# Patient Record
Sex: MALE | URBAN - METROPOLITAN AREA
[De-identification: names, ages, dates, MRNs, and addresses within clinical notes are randomized per-mention and may not be internally consistent; named-entity substitution may affect disease eponyms.]

---

## 2023-06-21 ENCOUNTER — EMERGENCY (EMERGENCY)
Facility: HOSPITAL | Age: 43
LOS: 1 days | Discharge: HOME | End: 2023-06-21
Attending: EMERGENCY MEDICINE
Payer: COMMERCIAL

## 2023-06-21 VITALS
TEMPERATURE: 98 F | RESPIRATION RATE: 18 BRPM | DIASTOLIC BLOOD PRESSURE: 80 MMHG | HEART RATE: 69 BPM | OXYGEN SATURATION: 98 % | SYSTOLIC BLOOD PRESSURE: 142 MMHG

## 2023-06-21 VITALS
OXYGEN SATURATION: 100 % | HEIGHT: 67 IN | TEMPERATURE: 98 F | DIASTOLIC BLOOD PRESSURE: 84 MMHG | RESPIRATION RATE: 18 BRPM | WEIGHT: 185.41 LBS | SYSTOLIC BLOOD PRESSURE: 142 MMHG | HEART RATE: 84 BPM

## 2023-06-21 DIAGNOSIS — R07.9 CHEST PAIN, UNSPECIFIED: ICD-10-CM

## 2023-06-21 PROBLEM — Z00.00 ENCOUNTER FOR PREVENTIVE HEALTH EXAMINATION: Status: ACTIVE | Noted: 2023-06-21

## 2023-06-21 LAB
ALBUMIN SERPL ELPH-MCNC: 4.4 G/DL — SIGNIFICANT CHANGE UP (ref 3.3–5.2)
ALP SERPL-CCNC: 70 U/L — SIGNIFICANT CHANGE UP (ref 40–120)
ALT FLD-CCNC: 30 U/L — SIGNIFICANT CHANGE UP
ANION GAP SERPL CALC-SCNC: 13 MMOL/L — SIGNIFICANT CHANGE UP (ref 5–17)
AST SERPL-CCNC: 26 U/L — SIGNIFICANT CHANGE UP
BASOPHILS # BLD AUTO: 0.04 K/UL — SIGNIFICANT CHANGE UP (ref 0–0.2)
BASOPHILS NFR BLD AUTO: 0.5 % — SIGNIFICANT CHANGE UP (ref 0–2)
BILIRUB SERPL-MCNC: 0.8 MG/DL — SIGNIFICANT CHANGE UP (ref 0.4–2)
BUN SERPL-MCNC: 12.9 MG/DL — SIGNIFICANT CHANGE UP (ref 8–20)
CALCIUM SERPL-MCNC: 9 MG/DL — SIGNIFICANT CHANGE UP (ref 8.4–10.5)
CHLORIDE SERPL-SCNC: 101 MMOL/L — SIGNIFICANT CHANGE UP (ref 96–108)
CO2 SERPL-SCNC: 22 MMOL/L — SIGNIFICANT CHANGE UP (ref 22–29)
CREAT SERPL-MCNC: 0.63 MG/DL — SIGNIFICANT CHANGE UP (ref 0.5–1.3)
EGFR: 121 ML/MIN/1.73M2 — SIGNIFICANT CHANGE UP
EOSINOPHIL # BLD AUTO: 0.04 K/UL — SIGNIFICANT CHANGE UP (ref 0–0.5)
EOSINOPHIL NFR BLD AUTO: 0.5 % — SIGNIFICANT CHANGE UP (ref 0–6)
GLUCOSE SERPL-MCNC: 91 MG/DL — SIGNIFICANT CHANGE UP (ref 70–99)
HCT VFR BLD CALC: 38.1 % — LOW (ref 39–50)
HGB BLD-MCNC: 13.6 G/DL — SIGNIFICANT CHANGE UP (ref 13–17)
IMM GRANULOCYTES NFR BLD AUTO: 0.2 % — SIGNIFICANT CHANGE UP (ref 0–0.9)
LIDOCAIN IGE QN: 30 U/L — SIGNIFICANT CHANGE UP (ref 22–51)
LYMPHOCYTES # BLD AUTO: 1.74 K/UL — SIGNIFICANT CHANGE UP (ref 1–3.3)
LYMPHOCYTES # BLD AUTO: 20.3 % — SIGNIFICANT CHANGE UP (ref 13–44)
MAGNESIUM SERPL-MCNC: 1.9 MG/DL — SIGNIFICANT CHANGE UP (ref 1.8–2.6)
MCHC RBC-ENTMCNC: 32.7 PG — SIGNIFICANT CHANGE UP (ref 27–34)
MCHC RBC-ENTMCNC: 35.7 GM/DL — SIGNIFICANT CHANGE UP (ref 32–36)
MCV RBC AUTO: 91.6 FL — SIGNIFICANT CHANGE UP (ref 80–100)
MONOCYTES # BLD AUTO: 0.56 K/UL — SIGNIFICANT CHANGE UP (ref 0–0.9)
MONOCYTES NFR BLD AUTO: 6.5 % — SIGNIFICANT CHANGE UP (ref 2–14)
NEUTROPHILS # BLD AUTO: 6.16 K/UL — SIGNIFICANT CHANGE UP (ref 1.8–7.4)
NEUTROPHILS NFR BLD AUTO: 72 % — SIGNIFICANT CHANGE UP (ref 43–77)
NT-PROBNP SERPL-SCNC: 16 PG/ML — SIGNIFICANT CHANGE UP (ref 0–300)
PLATELET # BLD AUTO: 186 K/UL — SIGNIFICANT CHANGE UP (ref 150–400)
POTASSIUM SERPL-MCNC: 4.2 MMOL/L — SIGNIFICANT CHANGE UP (ref 3.5–5.3)
POTASSIUM SERPL-SCNC: 4.2 MMOL/L — SIGNIFICANT CHANGE UP (ref 3.5–5.3)
PROT SERPL-MCNC: 6.7 G/DL — SIGNIFICANT CHANGE UP (ref 6.6–8.7)
RBC # BLD: 4.16 M/UL — LOW (ref 4.2–5.8)
RBC # FLD: 10.9 % — SIGNIFICANT CHANGE UP (ref 10.3–14.5)
SODIUM SERPL-SCNC: 136 MMOL/L — SIGNIFICANT CHANGE UP (ref 135–145)
TROPONIN T SERPL-MCNC: <0.01 NG/ML — SIGNIFICANT CHANGE UP (ref 0–0.06)
WBC # BLD: 8.56 K/UL — SIGNIFICANT CHANGE UP (ref 3.8–10.5)
WBC # FLD AUTO: 8.56 K/UL — SIGNIFICANT CHANGE UP (ref 3.8–10.5)

## 2023-06-21 PROCEDURE — 99285 EMERGENCY DEPT VISIT HI MDM: CPT

## 2023-06-21 PROCEDURE — 83735 ASSAY OF MAGNESIUM: CPT

## 2023-06-21 PROCEDURE — 93010 ELECTROCARDIOGRAM REPORT: CPT | Mod: 76

## 2023-06-21 PROCEDURE — 93005 ELECTROCARDIOGRAM TRACING: CPT

## 2023-06-21 PROCEDURE — 99285 EMERGENCY DEPT VISIT HI MDM: CPT | Mod: 25

## 2023-06-21 PROCEDURE — 84484 ASSAY OF TROPONIN QUANT: CPT

## 2023-06-21 PROCEDURE — 99284 EMERGENCY DEPT VISIT MOD MDM: CPT

## 2023-06-21 PROCEDURE — 80053 COMPREHEN METABOLIC PANEL: CPT

## 2023-06-21 PROCEDURE — 96374 THER/PROPH/DIAG INJ IV PUSH: CPT

## 2023-06-21 PROCEDURE — 71045 X-RAY EXAM CHEST 1 VIEW: CPT

## 2023-06-21 PROCEDURE — 36415 COLL VENOUS BLD VENIPUNCTURE: CPT

## 2023-06-21 PROCEDURE — 71045 X-RAY EXAM CHEST 1 VIEW: CPT | Mod: 26

## 2023-06-21 PROCEDURE — 85025 COMPLETE CBC W/AUTO DIFF WBC: CPT

## 2023-06-21 PROCEDURE — 83690 ASSAY OF LIPASE: CPT

## 2023-06-21 PROCEDURE — 83880 ASSAY OF NATRIURETIC PEPTIDE: CPT

## 2023-06-21 RX ORDER — FAMOTIDINE 10 MG/ML
20 INJECTION INTRAVENOUS ONCE
Refills: 0 | Status: COMPLETED | OUTPATIENT
Start: 2023-06-21 | End: 2023-06-21

## 2023-06-21 RX ORDER — ALPRAZOLAM 0.25 MG
0.5 TABLET ORAL ONCE
Refills: 0 | Status: DISCONTINUED | OUTPATIENT
Start: 2023-06-21 | End: 2023-06-21

## 2023-06-21 RX ORDER — HYDROXYZINE HCL 10 MG
1 TABLET ORAL
Qty: 20 | Refills: 0
Start: 2023-06-21 | End: 2023-06-25

## 2023-06-21 RX ORDER — ASPIRIN/CALCIUM CARB/MAGNESIUM 324 MG
162 TABLET ORAL ONCE
Refills: 0 | Status: COMPLETED | OUTPATIENT
Start: 2023-06-21 | End: 2023-06-21

## 2023-06-21 RX ADMIN — FAMOTIDINE 20 MILLIGRAM(S): 10 INJECTION INTRAVENOUS at 13:29

## 2023-06-21 RX ADMIN — Medication 162 MILLIGRAM(S): at 13:22

## 2023-06-21 RX ADMIN — Medication 0.5 MILLIGRAM(S): at 13:22

## 2023-06-21 NOTE — CONSULT NOTE ADULT - ASSESSMENT
41 y/o M with PMH anxiety, frequent ETOH use who presents to [facility] ED with 2 weeks of intermittent chest pain with radiation to epigastric area described as pressure like. 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 He had been ignoring the chest pain, but today while at work, the pain came back worse than before and he decided to come in for evaluation. He endorses ETOH use, about 6-10 drinks 4x a week. He denies symptoms with the chest pain. Trop negative x 1 and EKG without ischemic changes. Denies  back pain, headache, dizziness, SOB, PALMA, diaphoresis, syncope or N/V. 41 y/o M with PMH anxiety, frequent ETOH use who presents to [facility] ED with 2 weeks of intermittent chest pain with radiation to epigastric area described as pressure like. 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 He had been ignoring the chest pain, but today while at work, the pain came back worse than before and he decided to come in for evaluation. He endorses ETOH use, about 6-10 drinks 4x a week. He denies symptoms with the chest pain. Trop negative x 1 and EKG without ischemic changes. Denies  back pain, headache, dizziness, SOB, PALMA, diaphoresis, syncope or N/V. 41 y/o M with PMH anxiety, frequent ETOH use who presents to [facility] ED with 2 weeks of intermittent chest pain with radiation to epigastric area described as pressure like. 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 He had been ignoring the chest pain, but today while at work, the pain came back worse than before and he decided to come in for evaluation. He endorses ETOH use, about 6-10 drinks 4x a week. He denies symptoms with the chest pain. Trop negative x 1 and EKG without ischemic changes. Denies  back pain, headache, dizziness, SOB, PALMA, diaphoresis, syncope or N/V.

## 2023-06-21 NOTE — ED ADULT TRIAGE NOTE - CHIEF COMPLAINT QUOTE
[facility] URGENT [facility]ARE [facility]  [address] [address]  [phone]      November 10, 2022    RE:  Brayden Best                                                                                                                                                       [address] [facility] [address]            To whom it may concern:    Brayden Best is under my professional care for Left foot pain.   He  may return to work with the following: No working or lifting restrictions on or about after recheck with Podiatry.          Sincerely,        Santos Chapa DO    [facility]         pt states he is having mid chest pain, thought they were panick attacks, denies SOB  A&Ox3, resp wnl, c/o a lot of stress lately

## 2023-06-21 NOTE — ED PROVIDER NOTE - PROVIDER TOKENS
PROVIDER:[TOKEN:[[id number]]] PROVIDER:[TOKEN:[[id number]]] PROVIDER:[TOKEN:[[id number]]]

## 2023-06-21 NOTE — ED PROVIDER NOTE - PHYSICAL EXAMINATION
Gen: Well appearing in NAD  Head: NC/AT  Neck: trachea midline  Card: regular rate and rhythm  Resp:  No distress, CTAB  Abd: soft, non-distended, non-tender  Ext: no deformities  Neuro:  A&O appears non focal  Skin:  Warm and dry as visualized  Psych:  Normal affect and mood

## 2023-06-21 NOTE — ED PROVIDER NOTE - CLINICAL SUMMARY MEDICAL DECISION MAKING FREE TEXT BOX
43 yo male no pmh with chest pain , epigastric pain; pt under stress from work with chest pain x  2weeks;  serial troponin and close follow up with cardiology 41 yo male no pmh with chest pain , epigastric pain; pt under stress from work with chest pain x  2weeks;  serial troponin and close follow up with cardiology

## 2023-06-21 NOTE — ED ADULT NURSE REASSESSMENT NOTE - NSFALLUNIVINTERV_ED_ALL_ED
Bed/Stretcher in lowest position, wheels locked, appropriate side rails in place/Call bell, personal items and telephone in reach/Instruct patient to call for assistance before getting out of bed/chair/stretcher/Non-slip footwear applied when patient is off stretcher/[place] to call system/Physically safe environment - no spills, clutter or unnecessary equipment/Purposeful proactive rounding/Room/bathroom lighting operational, light cord in reach Bed/Stretcher in lowest position, wheels locked, appropriate side rails in place/Call bell, personal items and telephone in reach/Instruct patient to call for assistance before getting out of bed/chair/stretcher/Non-slip footwear applied when patient is off stretcher/[place] to call system/Physically safe environment - no spills, clutter or unnecessary equipment/Purposeful proactive rounding/Room/bathroom lighting operational, light cord in reach Bed/Stretcher in lowest position, wheels locked, appropriate side rails in place/Call bell, personal items and telephone in reach/Instruct patient to call for assistance before getting out of bed/chair/stretcher/Non-slip footwear applied when patient is off stretcher/[place] to call system/Physically safe environment - no spills, clutter or unnecessary equipment/Purposeful proactive rounding/Room/bathroom lighting operational, light cord in reach

## 2023-06-21 NOTE — ED ADULT NURSE REASSESSMENT NOTE - NS ED NURSE REASSESS COMMENT FT1
Pt is a&o x4. Pt is resting comfortably in stretcher. Pt is on  oxygen 98% and cardiac monitor reading normal sinus rhythm. Cardiology came to bedside and troponin repeated at 1700. Pt waiting for results.

## 2023-06-21 NOTE — CONSULT NOTE ADULT - SUBJECTIVE AND OBJECTIVE BOX
[facility]                                              CARDIOLOGY AT [place] [address]                                             Telephone: [phone]. Fax:[phone]                                                       CARDIOLOGY CONSULTATION NOTE                                                                                             History obtained by: Patient and medical record  Community Cardiologist: none   obtained: Yes [  ] No [ x ]  Reason for Consultation: Chest Pain   Available out pt records reviewed: Yes [  ] No [  ]    Chief complaint:    Patient is a 42y old  Male who presents with a chief complaint of     HPI:  43 y/o M with PMH anxiety, frequent ETOH use who presents to [facility] ED with 2 weeks of intermittent chest pain with radiation to epigastric area described as pressure like. 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 He had been ignoring the chest pain, but today while at work, the pain came back worse than before and he decided to come in for evaluation. He endorses ETOH use, about 6-10 drinks 4x a week. He denies symptoms with the chest pain. Trop negative x 1 and EKG without ischemic changes. Denies  back pain, headache, dizziness, SOB, PALMA, diaphoresis, syncope or N/V.    CARDIAC TESTING   ECHO:    STRESS:    CATH:     ELECTROPHYSIOLOGY:     PAST MEDICAL HISTORY      PAST SURGICAL HISTORY      SOCIAL HISTORY:  Denies smoking/alcohol/drugs  CIGARETTES:     ALCOHOL: etoh 6-10 drinks daily  DRUGS:    FAMILY HISTORY:    Family History of Cardiovascular Disease:  Yes [  ] No [ x ]  Coronary Artery Disease in first degree relative: Yes [  ] No [ x ]  Sudden Cardiac Death in First degree relative: Yes [  ] No [ x ]    HOME MEDICATIONS:      CURRENT CARDIAC MEDICATIONS:      CURRENT OTHER MEDICATIONS:      ALLERGIES:   No Known Allergies      REVIEW OF SYMPTOMS:   CONSTITUTIONAL: No fever, no chills, no weight loss, no weight gain, no fatigue   ENMT:  No vertigo; No sinus or throat pain  NECK: No pain or stiffness  CARDIOVASCULAR:+ chest pain, no dyspnea, no syncope/presyncope, no palpitations, no dizziness, no Orthopnea, no Paroxsymal nocturnal dyspnea  RESPIRATORY: no Shortness of breath, no cough, no wheezing  : No dysuria, no hematuria   GI: No dark color stool, no nausea, no diarrhea, no constipation, no abdominal pain   NEURO: No headache, no slurred speech   MUSCULOSKELETAL: No joint pain or swelling; No muscle, back, or extremity pain  PSYCH: No agitation, no anxiety.    ALL OTHER REVIEW OF SYSTEMS ARE NEGATIVE.    VITAL SIGNS:  T(C): 36.8 (06-21-23 @ 15:56), Max: 36.8 (06-21-23 @ 15:56)  T(F): 98.2 (06-21-23 @ 15:56), Max: 98.2 (06-21-23 @ 15:56)  HR: 78 (06-21-23 @ 15:56) (78 - 84)  BP: 124/72 (06-21-23 @ 15:56) (124/72 - 142/84)  RR: 18 (06-21-23 @ 15:56) (18 - 18)  SpO2: 98% (06-21-23 @ 15:56) (98% - 100%)    INTAKE AND OUTPUT:       PHYSICAL EXAM:  Constitutional: Comfortable . No acute distress.   HEENT: Atraumatic and normocephalic , neck is supple . no JVD. No carotid bruit.  CNS: A&Ox3. No focal deficits.   Respiratory: CTAB, unlabored   Cardiovascular: RRR normal s1 s2. No murmur. No rubs or gallop.  Gastrointestinal: Soft, non-tender. +Bowel sounds.   Extremities: 2+ Peripheral Pulses, No clubbing, cyanosis, or edema  Psychiatric: Calm . no agitation.   Skin: Warm and dry, no ulcers on extremities     LABS:  ( 21 Jun 2023 13:16 )  Troponin T  <0.01,  CPK  X    , CKMB  X    , BNP X                                  13.6   8.56  )-----------( 186      ( 21 Jun 2023 13:16 )             38.1     06-21    136  |  101  |  12.9  ----------------------------<  91  4.2   |  22.0  |  0.63    Ca    9.0      21 Jun 2023 13:16  Mg     1.9     06-21    TPro  6.7  /  Alb  4.4  /  TBili  0.8  /  DBili  x   /  AST  26  /  ALT  30  /  AlkPhos  70  06-21      Urinalysis Basic - ( 21 Jun 2023 13:16 )    Color: x / Appearance: x / SG: x / pH: x  Gluc: 91 mg/dL / Ketone: x  / Bili: x / Urobili: x   Blood: x / Protein: x / Nitrite: x   Leuk Esterase: x / RBC: x / WBC x   Sq Epi: x / Non Sq Epi: x / Bacteria: x              INTERPRETATION OF TELEMETRY:     ECG: NSR  Prior ECG: Yes [  ] No [  ]    RADIOLOGY & ADDITIONAL STUDIES:    X-ray:    CT scan:   MRI:   US:                                                [facility]                                              CARDIOLOGY AT [place] [address]                                             Telephone: [phone]. Fax:[phone]                                                       CARDIOLOGY CONSULTATION NOTE                                                                                             History obtained by: Patient and medical record  Community Cardiologist: none   obtained: Yes [  ] No [ x ]  Reason for Consultation: Chest Pain   Available out pt records reviewed: Yes [  ] No [  ]    Chief complaint:    Patient is a 42y old  Male who presents with a chief complaint of     HPI:  41 y/o M with PMH anxiety, frequent ETOH use who presents to [facility] ED with 2 weeks of intermittent chest pain with radiation to epigastric area described as pressure like. 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 He had been ignoring the chest pain, but today while at work, the pain came back worse than before and he decided to come in for evaluation. He endorses ETOH use, about 6-10 drinks 4x a week. He denies symptoms with the chest pain. Trop negative x 1 and EKG without ischemic changes. Denies  back pain, headache, dizziness, SOB, PALMA, diaphoresis, syncope or N/V.    CARDIAC TESTING   ECHO:    STRESS:    CATH:     ELECTROPHYSIOLOGY:     PAST MEDICAL HISTORY      PAST SURGICAL HISTORY      SOCIAL HISTORY:  Denies smoking/alcohol/drugs  CIGARETTES:     ALCOHOL: etoh 6-10 drinks daily  DRUGS:    FAMILY HISTORY:    Family History of Cardiovascular Disease:  Yes [  ] No [ x ]  Coronary Artery Disease in first degree relative: Yes [  ] No [ x ]  Sudden Cardiac Death in First degree relative: Yes [  ] No [ x ]    HOME MEDICATIONS:      CURRENT CARDIAC MEDICATIONS:      CURRENT OTHER MEDICATIONS:      ALLERGIES:   No Known Allergies      REVIEW OF SYMPTOMS:   CONSTITUTIONAL: No fever, no chills, no weight loss, no weight gain, no fatigue   ENMT:  No vertigo; No sinus or throat pain  NECK: No pain or stiffness  CARDIOVASCULAR:+ chest pain, no dyspnea, no syncope/presyncope, no palpitations, no dizziness, no Orthopnea, no Paroxsymal nocturnal dyspnea  RESPIRATORY: no Shortness of breath, no cough, no wheezing  : No dysuria, no hematuria   GI: No dark color stool, no nausea, no diarrhea, no constipation, no abdominal pain   NEURO: No headache, no slurred speech   MUSCULOSKELETAL: No joint pain or swelling; No muscle, back, or extremity pain  PSYCH: No agitation, no anxiety.    ALL OTHER REVIEW OF SYSTEMS ARE NEGATIVE.    VITAL SIGNS:  T(C): 36.8 (06-21-23 @ 15:56), Max: 36.8 (06-21-23 @ 15:56)  T(F): 98.2 (06-21-23 @ 15:56), Max: 98.2 (06-21-23 @ 15:56)  HR: 78 (06-21-23 @ 15:56) (78 - 84)  BP: 124/72 (06-21-23 @ 15:56) (124/72 - 142/84)  RR: 18 (06-21-23 @ 15:56) (18 - 18)  SpO2: 98% (06-21-23 @ 15:56) (98% - 100%)    INTAKE AND OUTPUT:       PHYSICAL EXAM:  Constitutional: Comfortable . No acute distress.   HEENT: Atraumatic and normocephalic , neck is supple . no JVD. No carotid bruit.  CNS: A&Ox3. No focal deficits.   Respiratory: CTAB, unlabored   Cardiovascular: RRR normal s1 s2. No murmur. No rubs or gallop.  Gastrointestinal: Soft, non-tender. +Bowel sounds.   Extremities: 2+ Peripheral Pulses, No clubbing, cyanosis, or edema  Psychiatric: Calm . no agitation.   Skin: Warm and dry, no ulcers on extremities     LABS:  ( 21 Jun 2023 13:16 )  Troponin T  <0.01,  CPK  X    , CKMB  X    , BNP X                                  13.6   8.56  )-----------( 186      ( 21 Jun 2023 13:16 )             38.1     06-21    136  |  101  |  12.9  ----------------------------<  91  4.2   |  22.0  |  0.63    Ca    9.0      21 Jun 2023 13:16  Mg     1.9     06-21    TPro  6.7  /  Alb  4.4  /  TBili  0.8  /  DBili  x   /  AST  26  /  ALT  30  /  AlkPhos  70  06-21      Urinalysis Basic - ( 21 Jun 2023 13:16 )    Color: x / Appearance: x / SG: x / pH: x  Gluc: 91 mg/dL / Ketone: x  / Bili: x / Urobili: x   Blood: x / Protein: x / Nitrite: x   Leuk Esterase: x / RBC: x / WBC x   Sq Epi: x / Non Sq Epi: x / Bacteria: x              INTERPRETATION OF TELEMETRY:     ECG: NSR  Prior ECG: Yes [  ] No [  ]    RADIOLOGY & ADDITIONAL STUDIES:    X-ray:    CT scan:   MRI:   US:                                                [facility]                                              CARDIOLOGY AT [place] [address]                                             Telephone: [phone]. Fax:[phone]                                                       CARDIOLOGY CONSULTATION NOTE                                                                                             History obtained by: Patient and medical record  Community Cardiologist: none   obtained: Yes [  ] No [ x ]  Reason for Consultation: Chest Pain   Available out pt records reviewed: Yes [  ] No [  ]    Chief complaint:    Patient is a 42y old  Male who presents with a chief complaint of     HPI:  41 y/o M with PMH anxiety, frequent ETOH use who presents to [facility] ED with 2 weeks of intermittent chest pain with radiation to epigastric area described as pressure like. 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 He had been ignoring the chest pain, but today while at work, the pain came back worse than before and he decided to come in for evaluation. He endorses ETOH use, about 6-10 drinks 4x a week. He denies symptoms with the chest pain. Trop negative x 1 and EKG without ischemic changes. Denies  back pain, headache, dizziness, SOB, PALMA, diaphoresis, syncope or N/V.    CARDIAC TESTING   ECHO:    STRESS:    CATH:     ELECTROPHYSIOLOGY:     PAST MEDICAL HISTORY      PAST SURGICAL HISTORY      SOCIAL HISTORY:  Denies smoking/alcohol/drugs  CIGARETTES:     ALCOHOL: etoh 6-10 drinks daily  DRUGS:    FAMILY HISTORY:    Family History of Cardiovascular Disease:  Yes [  ] No [ x ]  Coronary Artery Disease in first degree relative: Yes [  ] No [ x ]  Sudden Cardiac Death in First degree relative: Yes [  ] No [ x ]    HOME MEDICATIONS:      CURRENT CARDIAC MEDICATIONS:      CURRENT OTHER MEDICATIONS:      ALLERGIES:   No Known Allergies      REVIEW OF SYMPTOMS:   CONSTITUTIONAL: No fever, no chills, no weight loss, no weight gain, no fatigue   ENMT:  No vertigo; No sinus or throat pain  NECK: No pain or stiffness  CARDIOVASCULAR:+ chest pain, no dyspnea, no syncope/presyncope, no palpitations, no dizziness, no Orthopnea, no Paroxsymal nocturnal dyspnea  RESPIRATORY: no Shortness of breath, no cough, no wheezing  : No dysuria, no hematuria   GI: No dark color stool, no nausea, no diarrhea, no constipation, no abdominal pain   NEURO: No headache, no slurred speech   MUSCULOSKELETAL: No joint pain or swelling; No muscle, back, or extremity pain  PSYCH: No agitation, no anxiety.    ALL OTHER REVIEW OF SYSTEMS ARE NEGATIVE.    VITAL SIGNS:  T(C): 36.8 (06-21-23 @ 15:56), Max: 36.8 (06-21-23 @ 15:56)  T(F): 98.2 (06-21-23 @ 15:56), Max: 98.2 (06-21-23 @ 15:56)  HR: 78 (06-21-23 @ 15:56) (78 - 84)  BP: 124/72 (06-21-23 @ 15:56) (124/72 - 142/84)  RR: 18 (06-21-23 @ 15:56) (18 - 18)  SpO2: 98% (06-21-23 @ 15:56) (98% - 100%)    INTAKE AND OUTPUT:       PHYSICAL EXAM:  Constitutional: Comfortable . No acute distress.   HEENT: Atraumatic and normocephalic , neck is supple . no JVD. No carotid bruit.  CNS: A&Ox3. No focal deficits.   Respiratory: CTAB, unlabored   Cardiovascular: RRR normal s1 s2. No murmur. No rubs or gallop.  Gastrointestinal: Soft, non-tender. +Bowel sounds.   Extremities: 2+ Peripheral Pulses, No clubbing, cyanosis, or edema  Psychiatric: Calm . no agitation.   Skin: Warm and dry, no ulcers on extremities     LABS:  ( 21 Jun 2023 13:16 )  Troponin T  <0.01,  CPK  X    , CKMB  X    , BNP X                                  13.6   8.56  )-----------( 186      ( 21 Jun 2023 13:16 )             38.1     06-21    136  |  101  |  12.9  ----------------------------<  91  4.2   |  22.0  |  0.63    Ca    9.0      21 Jun 2023 13:16  Mg     1.9     06-21    TPro  6.7  /  Alb  4.4  /  TBili  0.8  /  DBili  x   /  AST  26  /  ALT  30  /  AlkPhos  70  06-21      Urinalysis Basic - ( 21 Jun 2023 13:16 )    Color: x / Appearance: x / SG: x / pH: x  Gluc: 91 mg/dL / Ketone: x  / Bili: x / Urobili: x   Blood: x / Protein: x / Nitrite: x   Leuk Esterase: x / RBC: x / WBC x   Sq Epi: x / Non Sq Epi: x / Bacteria: x              INTERPRETATION OF TELEMETRY:     ECG: NSR  Prior ECG: Yes [  ] No [  ]    RADIOLOGY & ADDITIONAL STUDIES:    X-ray:    CT scan:   MRI:   US:

## 2023-06-21 NOTE — ED ADULT NURSE NOTE - CHIEF COMPLAINT QUOTE
pt states he is having mid chest pain, thought they were panick attacks, denies SOB  A&Ox3, resp wnl, c/o a lot of stress lately

## 2023-06-21 NOTE — ED PROVIDER NOTE - CARE PROVIDER_API CALL
Evelio Valentin Can  Cardiovascular Disease  [address] [facility], [address] [place] [address]  Phone: [phone]  Fax: [phone]  Follow Up Time:    Evelio Valentin Can  Cardiovascular Disease  [address] [facility], [address] [place] [address]  Phone: [phone]  Fax: [phone]  Follow Up Time:    Evelio Valentin Can  Cardiovascular Disease  [address] [facility], [address] [place] [address]  Phone: [phone]  Fax: [phone]  Follow Up Time:

## 2023-06-21 NOTE — ED PROVIDER NOTE - PATIENT PORTAL LINK FT
You can access the FollowMyHealth Patient Portal offered by [facility] by registering at the following website: [email or web address]. By joining [employer]’s FollowMyHealth portal, you will also be able to view your health information using other applications (apps) compatible with our system. You can access the FollowMyHealth Patient Portal offered by [facility] by registering at the following website: [email or web address]. By joining [employer]’s FollowMyHealth portal, you will also be able to view your health information using other applications (apps) compatible with our system. You can access the FollowMyHealth Patient Portal offered by [facility] by registering at the following website: [email or web address]. By joining [employer]’s FollowMyHealth portal, you will also be able to view your health information using other applications (apps) compatible with our system.

## 2023-06-21 NOTE — ED PROVIDER NOTE - NS_ ATTENDINGSCRIBEDETAILS _ED_A_ED_FT
I, Fredy Bhakta, performed the initial face to face bedside interview with this patient regarding history of present illness, review of symptoms and relevant past medical, social and family history.  I completed an independent physical examination.   The history, relevant review of systems, past medical and surgical history, medical decision making, and physical examination was documented by the scribe in my presence and I attest to the accuracy of the documentation.

## 2023-06-21 NOTE — ED ADULT NURSE NOTE - NSFALLUNIVINTERV_ED_ALL_ED
Bed/Stretcher in lowest position, wheels locked, appropriate side rails in place/Call bell, personal items and telephone in reach/Instruct patient to call for assistance before getting out of bed/chair/stretcher/Non-slip footwear applied when patient is off stretcher/[place] to call system/Physically safe environment - no spills, clutter or unnecessary equipment/Purposeful proactive rounding/Room/bathroom lighting operational, light cord in reach Bed/Stretcher in lowest position, wheels locked, appropriate side rails in place/Call bell, personal items and telephone in reach/Instruct patient to call for assistance before getting out of bed/chair/stretcher/Non-slip footwear applied when patient is off stretcher/[place] to call system/Physically safe environment - no spills, clutter or unnecessary equipment/Purposeful proactive rounding/Room/bathroom lighting operational, light cord in reach Bed/Stretcher in lowest position, wheels locked, appropriate side rails in place/Call bell, personal items and telephone in reach/Instruct patient to call for assistance before getting out of bed/chair/stretcher/Non-slip footwear applied when patient is off stretcher/[place] to call system/Physically safe environment - no spills, clutter or unnecessary equipment/Purposeful proactive rounding/Room/bathroom lighting operational, light cord in reach

## 2023-06-21 NOTE — ED ADULT NURSE NOTE - OBJECTIVE STATEMENT
Pt is a&o x4 complaining of chest pain in middle chest area 7/10. Pt stated they have "a lot going on" and feels overwhelmed recently. Chest pain started when he woke up around 5:30 am. Pt is on cardiac monitor in normal sinus rhythm and . Pt denies past medical history or taking any medications at home. Pt is a&o x4 complaining of chest tightness in middle chest area. Pt stated they have "a lot going on" and feels overwhelmed recently. Chest discomfort started when he woke up around 5:30 am. Pt is on cardiac monitor in normal sinus rhythm and . Pt denies past medical history or taking any medications at home but stated he drank 5 white claws last night.

## 2023-06-21 NOTE — ED PROVIDER NOTE - NSFOLLOWUPINSTRUCTIONS_ED_ALL_ED_FT
vistaril 50mg by mouth every 6 hours for anxiety;   motrin 600mg by mouth every 6 houirs  follow up with cardiology in 10 - 14 days

## 2023-06-21 NOTE — CONSULT NOTE ADULT - NS ATTEND AMEND GEN_ALL_CORE FT
seen with above,    42M history significant for anxiety, chronic alcohol use presents with midepigastric, L-chest pain for few days, not worsen with exertion, EKG and Troponins negative, was given Xanax and famotidine in the ER reports some relief  -overall low cardiac risk factor and HEART score low, not indicated for inpatient workup as he also prefer not to stay overnight for further cardiac testing, can establish care in the office for outpatient testing  -need to reduce alcohol use and consider empiric PPI for GERD  -stable for discharge home from cardiac standpoint      Evelio Valentin DO, [facility]  Faculty Non-Invasive Cardiologist  [phone] seen with above,    42M history significant for anxiety, chronic alcohol use presents with midepigastric, L-chest pain for few days, not worsen with exertion, EKG and Troponins negative, was given Xanax and famotidine in the ER reports some relief  -overall low cardiac risk factor and HEART score low, not indicated for inpatient workup as he also prefer not to stay overnight for further cardiac testing, can establish care in the office for outpatient testing  -need to reduce alcohol use and consider empiric PPI for GERD  -stable for discharge home from cardiac standpoint      Evelio Valentin DO, [facility]  Faculty Non-Invasive Cardiologist  [phone] seen with above,    42M history significant for anxiety, chronic alcohol use presents with midepigastric, L-chest pain for few days, not worsen with exertion, EKG and Troponins negative, was given Xanax and famotidine in the ER reports some relief  -overall low cardiac risk factor and HEART score low, not indicated for inpatient workup as he also prefer not to stay overnight for further cardiac testing, can establish care in the office for outpatient testing  -need to reduce alcohol use and consider empiric PPI for GERD  -stable for discharge home from cardiac standpoint      Evelio Valentin DO, [facility]  Faculty Non-Invasive Cardiologist  [phone]

## 2023-06-21 NOTE — CONSULT NOTE ADULT - PROBLEM SELECTOR RECOMMENDATION 9
.  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 Valentin for further cardiac evaluation

## 2023-09-13 NOTE — ED ADULT TRIAGE NOTE - STATUS:
Continued Stay / Assessment/Plan of Care Note       Active Substitute Decision Maker (SDM)    There are no active Substitute Decision Maker (SDM) on file.           Progress note:  9:16 Spoke with bedside nurse who states she has not seen the patient walk, but will walk with patient today.   See / flowsheets for other objective data.    Disposition Recommendations:  Preliminary discharge destination:    / recommendation for discharge: Home    Discharge Plan/Needs:     Continued Care and Services - Admitted Since 9/11/2023    Coordination has not been started for this encounter.           Devices/ Equipment that need to be arranged for discharge:     Accepted   Pending insurance authorization   Others:    Anticipated date of DME availability:     Prior To Hospitalization:    Living Situation: Other (comments) (step son) and residing at Apartment    .  Support Systems: Family members   Home Devices/Equipment: CPAP/BiPAP machine (T) ,   ,    ,      Mobility Assist Devices: None   Type of Service Prior to Hospitalization: None ,   ,   ,   ,    ,       Patient/Family discharge goal (s):  Home     Resources provided:           Therapy Recommendations for Discharge:   PT:      Last Filed Values     None        OT:       Last Filed Values     None        SLP:    Last Filed Values     None          Mobility Equipment Recommended for Discharge:        Barriers to Discharge  Identified Barriers to Discharge/Transition Planning:                   Applied

## 2024-06-21 NOTE — ED ADULT TRIAGE NOTE - GLASGOW COMA SCALE: SCORE, MLM
15
pt in ED after outburst at his group setting, now resolved, without signs of trauma or acute findings on exam; pt redirectable and without need for medication in ED; will get additional collateral with the assistance of KEREN MG, and pt likely to be discharged back to his group setting

## 2024-07-30 PROBLEM — Z78.9 OTHER SPECIFIED HEALTH STATUS: Chronic | Status: ACTIVE | Noted: 2024-07-29

## 2024-08-04 ENCOUNTER — EMERGENCY (EMERGENCY)
Facility: HOSPITAL | Age: 44
LOS: 1 days | Discharge: HOME | End: 2024-08-04
Attending: STUDENT IN AN ORGANIZED HEALTH CARE EDUCATION/TRAINING PROGRAM
Payer: COMMERCIAL

## 2024-08-04 VITALS
HEART RATE: 75 BPM | RESPIRATION RATE: 18 BRPM | SYSTOLIC BLOOD PRESSURE: 167 MMHG | DIASTOLIC BLOOD PRESSURE: 117 MMHG | OXYGEN SATURATION: 98 % | TEMPERATURE: 98 F

## 2024-08-04 DIAGNOSIS — Z90.49 ACQUIRED ABSENCE OF OTHER SPECIFIED PARTS OF DIGESTIVE TRACT: Chronic | ICD-10-CM

## 2024-08-04 PROCEDURE — 99284 EMERGENCY DEPT VISIT MOD MDM: CPT

## 2024-08-04 NOTE — ED ADULT TRIAGE NOTE - CHIEF COMPLAINT QUOTE
pt here for abd pain, here for same this week without acute finding. referred to GI doc, awaiting endoscopy next week for further diagnosis but could not handle the pain in abd. went ot urgent care tonight for pain meds which have not helped.  resp wnl, a&ox4

## 2024-08-05 VITALS
SYSTOLIC BLOOD PRESSURE: 167 MMHG | OXYGEN SATURATION: 99 % | HEART RATE: 71 BPM | RESPIRATION RATE: 17 BRPM | DIASTOLIC BLOOD PRESSURE: 105 MMHG | TEMPERATURE: 98 F

## 2024-08-05 LAB
ALBUMIN SERPL ELPH-MCNC: 3.9 G/DL — SIGNIFICANT CHANGE UP (ref 3.3–5.2)
ALP SERPL-CCNC: 66 U/L — SIGNIFICANT CHANGE UP (ref 40–120)
ALT FLD-CCNC: 23 U/L — SIGNIFICANT CHANGE UP
ANION GAP SERPL CALC-SCNC: 12 MMOL/L — SIGNIFICANT CHANGE UP (ref 5–17)
APPEARANCE UR: CLEAR — SIGNIFICANT CHANGE UP
AST SERPL-CCNC: 20 U/L — SIGNIFICANT CHANGE UP
BASOPHILS # BLD AUTO: 0.03 K/UL — SIGNIFICANT CHANGE UP (ref 0–0.2)
BASOPHILS NFR BLD AUTO: 0.4 % — SIGNIFICANT CHANGE UP (ref 0–2)
BILIRUB SERPL-MCNC: 0.8 MG/DL — SIGNIFICANT CHANGE UP (ref 0.4–2)
BILIRUB UR-MCNC: NEGATIVE — SIGNIFICANT CHANGE UP
BUN SERPL-MCNC: 9.2 MG/DL — SIGNIFICANT CHANGE UP (ref 8–20)
CALCIUM SERPL-MCNC: 8.5 MG/DL — SIGNIFICANT CHANGE UP (ref 8.4–10.5)
CHLORIDE SERPL-SCNC: 99 MMOL/L — SIGNIFICANT CHANGE UP (ref 96–108)
CO2 SERPL-SCNC: 24 MMOL/L — SIGNIFICANT CHANGE UP (ref 22–29)
COLOR SPEC: YELLOW — SIGNIFICANT CHANGE UP
CREAT SERPL-MCNC: 0.93 MG/DL — SIGNIFICANT CHANGE UP (ref 0.5–1.3)
DIFF PNL FLD: NEGATIVE — SIGNIFICANT CHANGE UP
EGFR: 104 ML/MIN/1.73M2 — SIGNIFICANT CHANGE UP
EOSINOPHIL # BLD AUTO: 0.24 K/UL — SIGNIFICANT CHANGE UP (ref 0–0.5)
EOSINOPHIL NFR BLD AUTO: 3.3 % — SIGNIFICANT CHANGE UP (ref 0–6)
GLUCOSE SERPL-MCNC: 86 MG/DL — SIGNIFICANT CHANGE UP (ref 70–99)
GLUCOSE UR QL: NEGATIVE MG/DL — SIGNIFICANT CHANGE UP
HCT VFR BLD CALC: 35 % — LOW (ref 39–50)
HGB BLD-MCNC: 12.7 G/DL — LOW (ref 13–17)
IMM GRANULOCYTES NFR BLD AUTO: 0.3 % — SIGNIFICANT CHANGE UP (ref 0–0.9)
KETONES UR-MCNC: 15 MG/DL
LEUKOCYTE ESTERASE UR-ACNC: NEGATIVE — SIGNIFICANT CHANGE UP
LIDOCAIN IGE QN: 30 U/L — SIGNIFICANT CHANGE UP (ref 22–51)
LYMPHOCYTES # BLD AUTO: 2.4 K/UL — SIGNIFICANT CHANGE UP (ref 1–3.3)
LYMPHOCYTES # BLD AUTO: 32.7 % — SIGNIFICANT CHANGE UP (ref 13–44)
MCHC RBC-ENTMCNC: 32.8 PG — SIGNIFICANT CHANGE UP (ref 27–34)
MCHC RBC-ENTMCNC: 36.3 GM/DL — HIGH (ref 32–36)
MCV RBC AUTO: 90.4 FL — SIGNIFICANT CHANGE UP (ref 80–100)
MONOCYTES # BLD AUTO: 0.61 K/UL — SIGNIFICANT CHANGE UP (ref 0–0.9)
MONOCYTES NFR BLD AUTO: 8.3 % — SIGNIFICANT CHANGE UP (ref 2–14)
NEUTROPHILS # BLD AUTO: 4.04 K/UL — SIGNIFICANT CHANGE UP (ref 1.8–7.4)
NEUTROPHILS NFR BLD AUTO: 55 % — SIGNIFICANT CHANGE UP (ref 43–77)
NITRITE UR-MCNC: NEGATIVE — SIGNIFICANT CHANGE UP
PH UR: 6 — SIGNIFICANT CHANGE UP (ref 5–8)
PLATELET # BLD AUTO: 188 K/UL — SIGNIFICANT CHANGE UP (ref 150–400)
POTASSIUM SERPL-MCNC: 3.9 MMOL/L — SIGNIFICANT CHANGE UP (ref 3.5–5.3)
POTASSIUM SERPL-SCNC: 3.9 MMOL/L — SIGNIFICANT CHANGE UP (ref 3.5–5.3)
PROT SERPL-MCNC: 6.5 G/DL — LOW (ref 6.6–8.7)
PROT UR-MCNC: NEGATIVE MG/DL — SIGNIFICANT CHANGE UP
RBC # BLD: 3.87 M/UL — LOW (ref 4.2–5.8)
RBC # FLD: 11.5 % — SIGNIFICANT CHANGE UP (ref 10.3–14.5)
SODIUM SERPL-SCNC: 135 MMOL/L — SIGNIFICANT CHANGE UP (ref 135–145)
SP GR SPEC: 1.01 — SIGNIFICANT CHANGE UP (ref 1–1.03)
UROBILINOGEN FLD QL: 0.2 MG/DL — SIGNIFICANT CHANGE UP (ref 0.2–1)
WBC # BLD: 7.34 K/UL — SIGNIFICANT CHANGE UP (ref 3.8–10.5)
WBC # FLD AUTO: 7.34 K/UL — SIGNIFICANT CHANGE UP (ref 3.8–10.5)

## 2024-08-05 PROCEDURE — 81003 URINALYSIS AUTO W/O SCOPE: CPT

## 2024-08-05 PROCEDURE — 36415 COLL VENOUS BLD VENIPUNCTURE: CPT

## 2024-08-05 PROCEDURE — 96375 TX/PRO/DX INJ NEW DRUG ADDON: CPT

## 2024-08-05 PROCEDURE — 93010 ELECTROCARDIOGRAM REPORT: CPT

## 2024-08-05 PROCEDURE — 99284 EMERGENCY DEPT VISIT MOD MDM: CPT | Mod: 25

## 2024-08-05 PROCEDURE — 96374 THER/PROPH/DIAG INJ IV PUSH: CPT

## 2024-08-05 PROCEDURE — 83690 ASSAY OF LIPASE: CPT

## 2024-08-05 PROCEDURE — 93005 ELECTROCARDIOGRAM TRACING: CPT

## 2024-08-05 PROCEDURE — 85025 COMPLETE CBC W/AUTO DIFF WBC: CPT

## 2024-08-05 PROCEDURE — 80053 COMPREHEN METABOLIC PANEL: CPT

## 2024-08-05 RX ORDER — ONDANSETRON HCL/PF 4 MG/2 ML
1 VIAL (ML) INJECTION
Qty: 6 | Refills: 0
Start: 2024-08-05

## 2024-08-05 RX ORDER — ONDANSETRON HCL/PF 4 MG/2 ML
4 VIAL (ML) INJECTION ONCE
Refills: 0 | Status: COMPLETED | OUTPATIENT
Start: 2024-08-05 | End: 2024-08-05

## 2024-08-05 RX ORDER — SUCRALFATE 1 G/1
10 TABLET ORAL
Qty: 500 | Refills: 0
Start: 2024-08-05

## 2024-08-05 RX ORDER — SUCRALFATE 1 G/1
1 TABLET ORAL ONCE
Refills: 0 | Status: COMPLETED | OUTPATIENT
Start: 2024-08-05 | End: 2024-08-05

## 2024-08-05 RX ORDER — FAMOTIDINE 40 MG/1
20 TABLET, FILM COATED ORAL ONCE
Refills: 0 | Status: COMPLETED | OUTPATIENT
Start: 2024-08-05 | End: 2024-08-05

## 2024-08-05 RX ORDER — BACTERIOSTATIC SODIUM CHLORIDE 0.9 %
1000 VIAL (ML) INJECTION ONCE
Refills: 0 | Status: COMPLETED | OUTPATIENT
Start: 2024-08-05 | End: 2024-08-05

## 2024-08-05 RX ORDER — FAMOTIDINE 40 MG/1
1 TABLET, FILM COATED ORAL
Qty: 40 | Refills: 0
Start: 2024-08-05

## 2024-08-05 RX ORDER — MAGNESIUM, ALUMINUM HYDROXIDE 200-225/5
30 SUSPENSION, ORAL (FINAL DOSE FORM) ORAL ONCE
Refills: 0 | Status: COMPLETED | OUTPATIENT
Start: 2024-08-05 | End: 2024-08-05

## 2024-08-05 RX ADMIN — SUCRALFATE 1 GRAM(S): 1 TABLET ORAL at 01:13

## 2024-08-05 RX ADMIN — Medication 30 MILLILITER(S): at 03:11

## 2024-08-05 RX ADMIN — Medication 4 MILLIGRAM(S): at 01:13

## 2024-08-05 RX ADMIN — Medication 1000 MILLILITER(S): at 01:13

## 2024-08-05 RX ADMIN — FAMOTIDINE 20 MILLIGRAM(S): 40 TABLET, FILM COATED ORAL at 01:13

## 2024-08-05 NOTE — ED PROVIDER NOTE - ATTENDING APP SHARED VISIT CONTRIBUTION OF CARE
43-year-old male presents with epigastric pain associated with nausea.  Patient states he has been drinking a lot of alcohol recently due to stress.  Patient was here about a week ago for similar symptoms had CT done with no acute findings and was told to see GI patient has endoscopy scheduled in 2 weeks.  Patient with labs no acute findings likely GERD/gastritis.  Patient  instructed to take dietary modifications eliminate alcohol consumption because it could worsen symptoms.  Started on Carafate as well.  Patient structured to follow-up with GI.  Patient given strict return precautions.

## 2024-08-05 NOTE — ED PROVIDER NOTE - PATIENT PORTAL LINK FT
You can access the FollowMyHealth Patient Portal offered by [facility] by registering at the following website: [email or web address]. By joining [employer]’s FollowMyHealth portal, you will also be able to view your health information using other applications (apps) compatible with our system.

## 2024-08-05 NOTE — ED PROVIDER NOTE - NSFOLLOWUPINSTRUCTIONS_ED_ALL_ED_FT
Please follow up with primary care doctor in 2-3 days  Please follow up with gastroenterology  Return to ER for any new or worsening symptoms    Gastritis    Gastritis is soreness and swelling (inflammation) of the lining of the stomach. Gastritis can develop as a sudden onset (acute) or long-term (chronic) condition. If gastritis is not treated, it can lead to stomach bleeding and ulcers. Causes include viral and bacterial infections, excessive alcohol consumption, tobacco use, or certain medications. Symptoms include nausea, vomiting, or abdominal pain or burning especially after eating. Avoid foods or drinks that make your symptoms worse such as caffeine, chocolate, spicy foods, acidic foods, or alcohol.    SEEK IMMEDIATE MEDICAL CARE IF YOU HAVE ANY OF THE FOLLOWING SYMPTOMS: black or bloody stools, blood or coffee-ground-colored vomitus, worsening abdominal pain, fever, or inability to keep fluids down.

## 2024-08-05 NOTE — ED PROVIDER NOTE - OBJECTIVE STATEMENT
42 yo M presents c/o abd pain. Mostly epigastric region, radiates around abdomen and to chest, +nausea. Initially with vomiting when sx first began >1week ago. Had been drinking a lot of alcohol recently due to stress. Seen in ED last week labs and CT done, was [place]'ed home. Saw GI and is scheduled for endoscopy in 2 weeks. Coming in for persistent sx. Taking PPI in AM, pepcid at night . No diarrhea no blood In stool or melena. No fevers. No other complaints at this time

## 2024-08-05 NOTE — ED ADULT NURSE NOTE - OBJECTIVE STATEMENT
pt reports that he has been experiencing abd pain along with the sensation that something is stuck in his throat. pt states that it is hard to eat/drink anything d/t the pain & is having a hard time sleeping from it as well. pt reports that he has seen an outpt GI doctor & is scheduled for an endoscopy next week but is unable to bear the pain until then. father at bedside. respirations even and unlabored. pt shows no s/s of acute distress at this time. safety precautions maintained.

## 2024-11-06 ENCOUNTER — APPOINTMENT (OUTPATIENT)
Dept: GASTROENTEROLOGY | Facility: CLINIC | Age: 44
End: 2024-11-06

## 2025-01-31 NOTE — ED ADULT TRIAGE NOTE - GLASGOW COMA SCALE: BEST MOTOR RESPONSE, MLM
1/31/2025  I, Teo Singleton DO, saw and evaluated the patient. I have discussed the patient with the resident/non-physician practitioner and agree with the resident's/non-physician practitioner's findings, Plan of Care, and MDM as documented in the resident's/non-physician practitioner's note, except where noted. All available labs and Radiology studies were reviewed.  I was present for key portions of any procedure(s) performed by the resident/non-physician practitioner and I was immediately available to provide assistance.       At this point I agree with the current assessment done in the Emergency Department.  I have conducted an independent evaluation of this patient a history and physical is as follows:    ED Course     Resident reports Sore throat fever, exudate at tonsils, tender lymphadenopathy, ill since yesterday.  Lungs clear.  No rashes.  Strep swab.      I evaluated patient, I don't see exudate or enlarged tonsils.  She has mild erythema and no significant tender anterior lymphadenopathy - mother states that she does have nasal congestion and mild cough as well.  Probable viral syndrome, treat with supportive measures.    Critical Care Time  Procedures      
(M6) obeys commands